# Patient Record
Sex: MALE | Race: WHITE
[De-identification: names, ages, dates, MRNs, and addresses within clinical notes are randomized per-mention and may not be internally consistent; named-entity substitution may affect disease eponyms.]

---

## 2019-11-11 ENCOUNTER — HOSPITAL ENCOUNTER (OUTPATIENT)
Dept: HOSPITAL 19 - COL.RAD | Age: 29
End: 2019-11-11
Payer: COMMERCIAL

## 2019-11-11 DIAGNOSIS — Z12.89: Primary | ICD-10-CM

## 2019-11-11 DIAGNOSIS — D12.6: ICD-10-CM

## 2019-11-11 DIAGNOSIS — Z90.49: ICD-10-CM

## 2023-04-06 ENCOUNTER — HOSPITAL ENCOUNTER (OUTPATIENT)
Dept: HOSPITAL 19 - COL.RAD | Age: 33
End: 2023-04-06
Attending: UROLOGY
Payer: COMMERCIAL

## 2023-04-06 VITALS — SYSTOLIC BLOOD PRESSURE: 117 MMHG | DIASTOLIC BLOOD PRESSURE: 71 MMHG | HEART RATE: 74 BPM

## 2023-04-06 VITALS — SYSTOLIC BLOOD PRESSURE: 121 MMHG | DIASTOLIC BLOOD PRESSURE: 72 MMHG | HEART RATE: 62 BPM

## 2023-04-06 VITALS — HEART RATE: 59 BPM | SYSTOLIC BLOOD PRESSURE: 117 MMHG | DIASTOLIC BLOOD PRESSURE: 75 MMHG

## 2023-04-06 VITALS — SYSTOLIC BLOOD PRESSURE: 113 MMHG | HEART RATE: 60 BPM | DIASTOLIC BLOOD PRESSURE: 74 MMHG

## 2023-04-06 VITALS — DIASTOLIC BLOOD PRESSURE: 55 MMHG | HEART RATE: 62 BPM | SYSTOLIC BLOOD PRESSURE: 146 MMHG | TEMPERATURE: 98.3 F

## 2023-04-06 VITALS — HEART RATE: 59 BPM | SYSTOLIC BLOOD PRESSURE: 114 MMHG | DIASTOLIC BLOOD PRESSURE: 70 MMHG

## 2023-04-06 VITALS — SYSTOLIC BLOOD PRESSURE: 123 MMHG | DIASTOLIC BLOOD PRESSURE: 83 MMHG

## 2023-04-06 VITALS — DIASTOLIC BLOOD PRESSURE: 79 MMHG | HEART RATE: 74 BPM | SYSTOLIC BLOOD PRESSURE: 128 MMHG

## 2023-04-06 VITALS — DIASTOLIC BLOOD PRESSURE: 75 MMHG | HEART RATE: 54 BPM | SYSTOLIC BLOOD PRESSURE: 113 MMHG

## 2023-04-06 VITALS — SYSTOLIC BLOOD PRESSURE: 130 MMHG | DIASTOLIC BLOOD PRESSURE: 76 MMHG | HEART RATE: 73 BPM

## 2023-04-06 VITALS — HEART RATE: 63 BPM | SYSTOLIC BLOOD PRESSURE: 115 MMHG | DIASTOLIC BLOOD PRESSURE: 74 MMHG

## 2023-04-06 VITALS — DIASTOLIC BLOOD PRESSURE: 73 MMHG | SYSTOLIC BLOOD PRESSURE: 115 MMHG | HEART RATE: 73 BPM

## 2023-04-06 VITALS — DIASTOLIC BLOOD PRESSURE: 73 MMHG | HEART RATE: 68 BPM | SYSTOLIC BLOOD PRESSURE: 123 MMHG

## 2023-04-06 VITALS — DIASTOLIC BLOOD PRESSURE: 70 MMHG | SYSTOLIC BLOOD PRESSURE: 120 MMHG | HEART RATE: 55 BPM

## 2023-04-06 VITALS — WEIGHT: 262.35 LBS | BODY MASS INDEX: 41.18 KG/M2 | HEIGHT: 67.01 IN

## 2023-04-06 VITALS — DIASTOLIC BLOOD PRESSURE: 69 MMHG | HEART RATE: 56 BPM | SYSTOLIC BLOOD PRESSURE: 116 MMHG

## 2023-04-06 DIAGNOSIS — N28.89: Primary | ICD-10-CM

## 2023-04-06 NOTE — NUR
pt to ct per ambulation.  Pt positioned in prone position on ct table.
Monitors applied.  O2 on at 2l/nc.

## 2024-08-15 ENCOUNTER — HOSPITAL ENCOUNTER (INPATIENT)
Dept: HOSPITAL 19 - COL.ER | Age: 34
LOS: 2 days | Discharge: HOME | DRG: 641 | End: 2024-08-17
Attending: INTERNAL MEDICINE | Admitting: INTERNAL MEDICINE
Payer: COMMERCIAL

## 2024-08-15 VITALS — WEIGHT: 220.02 LBS | HEIGHT: 67.01 IN | BODY MASS INDEX: 34.53 KG/M2

## 2024-08-15 DIAGNOSIS — N17.9: ICD-10-CM

## 2024-08-15 DIAGNOSIS — M62.838: ICD-10-CM

## 2024-08-15 DIAGNOSIS — R11.2: ICD-10-CM

## 2024-08-15 DIAGNOSIS — E86.0: Primary | ICD-10-CM

## 2024-08-15 DIAGNOSIS — Z20.822: ICD-10-CM

## 2024-08-15 DIAGNOSIS — R50.9: ICD-10-CM

## 2024-08-15 DIAGNOSIS — N28.89: ICD-10-CM

## 2024-08-15 LAB
ALBUMIN SERPL-MCNC: 2.7 G/DL (ref 3.5–5)
ALP SERPL-CCNC: 179 U/L (ref 40–150)
ALT SERPL-CCNC: 51 U/L (ref 0–55)
ANION GAP SERPL CALC-SCNC: 25 MMOL/L (ref 7–16)
APPEARANCE UR: CLEAR
AST SERPL-CCNC: 38 U/L (ref 5–34)
BILIRUB SERPL-MCNC: 1.5 MG/DL (ref 0.2–1.2)
BUN SERPL-MCNC: 13 MG/DL (ref 9–21)
CALCIUM SERPL-MCNC: 9.1 MG/DL (ref 8.4–10.2)
CHLORIDE SERPL-SCNC: 91 MEQ/L (ref 98–107)
COLOR UR AUTO: YELLOW
CREAT SERPL-SCNC: 1.57 MG/DL (ref 0.72–1.25)
CRP SERPL-MCNC: 22.03 MG/DL (ref 0–0.5)
DRUGS UR SCN NOM: NEGATIVE NG/ML
ERYTHROCYTE [DISTWIDTH] IN BLOOD BY AUTOMATED COUNT: 12.6 % (ref 11.5–14.5)
GLUCOSE SERPL-MCNC: 110 MG/DL (ref 70–99)
GLUCOSE UR QL STRIP.AUTO: NEGATIVE
HCT VFR BLD AUTO: 50.3 % (ref 42–52)
HGB BLD-MCNC: 17.2 G/DL (ref 13.5–18)
KETONES UR STRIP.AUTO-MCNC: NEGATIVE MG/DL
LYMPHOCYTES NFR BLD MANUAL: 1 % (ref 20–51)
MCH RBC QN AUTO: 29 PG (ref 27–31)
MCHC RBC AUTO-ENTMCNC: 34 G/DL (ref 33–37)
MCV RBC AUTO: 86 FL (ref 80–100)
MONOCYTES NFR BLD: 2 % (ref 1.7–9.3)
NEUTS BAND NFR BLD: 6 % (ref 0–10)
NEUTS SEG NFR BLD MANUAL: 91 % (ref 42–75.2)
NITRATE UR-MCNC: NEGATIVE UG/ML
PH UR STRIP.AUTO: 5.5 [PH] (ref 5–8.5)
PLATELET # BLD AUTO: 349 K/MM3 (ref 130–400)
PLATELET BLD QL SMEAR: NORMAL
PMV BLD AUTO: 9.1 FL (ref 7.4–10.4)
POTASSIUM SERPL-SCNC: 4.3 MEQ/L (ref 3.5–4.5)
PROT SERPL-MCNC: 8.1 G/DL (ref 6.2–8.1)
RBC # BLD AUTO: 5.87 M/MM3 (ref 4.2–5.6)
RBC # UR STRIP.AUTO: NEGATIVE /UL
RBC # UR: (no result) /HPF (ref 0–2)
SODIUM SERPL-SCNC: 132 MEQ/L (ref 136–145)
SP GR UR STRIP.AUTO: 1.01 (ref 1–1.03)
TOXIC GRANULES BLD QL SMEAR: PRESENT
UA DIPSTICK PNL UR STRIP.AUTO: (no result)
URN COLLECT METHOD CLASS: (no result)
UROBILINOGEN UR STRIP.AUTO-MCNC: 0.2 E.U/DL (ref 0.2–1)
WBC # UR: (no result) /HPF (ref 0–2)

## 2024-08-16 VITALS — DIASTOLIC BLOOD PRESSURE: 72 MMHG | HEART RATE: 75 BPM | TEMPERATURE: 98.6 F | SYSTOLIC BLOOD PRESSURE: 124 MMHG

## 2024-08-16 VITALS — SYSTOLIC BLOOD PRESSURE: 106 MMHG | HEART RATE: 73 BPM | TEMPERATURE: 97.6 F | DIASTOLIC BLOOD PRESSURE: 67 MMHG

## 2024-08-16 VITALS — SYSTOLIC BLOOD PRESSURE: 124 MMHG | HEART RATE: 73 BPM | TEMPERATURE: 98.7 F | DIASTOLIC BLOOD PRESSURE: 73 MMHG

## 2024-08-16 VITALS — DIASTOLIC BLOOD PRESSURE: 73 MMHG | TEMPERATURE: 98.3 F | HEART RATE: 76 BPM | SYSTOLIC BLOOD PRESSURE: 125 MMHG

## 2024-08-16 VITALS — HEART RATE: 76 BPM | TEMPERATURE: 97.8 F | DIASTOLIC BLOOD PRESSURE: 74 MMHG | SYSTOLIC BLOOD PRESSURE: 113 MMHG

## 2024-08-16 VITALS — SYSTOLIC BLOOD PRESSURE: 124 MMHG

## 2024-08-16 VITALS — SYSTOLIC BLOOD PRESSURE: 125 MMHG

## 2024-08-16 LAB
ANION GAP SERPL CALC-SCNC: 16 MMOL/L (ref 7–16)
BUN SERPL-MCNC: 14 MG/DL (ref 9–21)
BURR CELLS BLD QL SMEAR: (no result)
CALCIUM SERPL-MCNC: 8.4 MG/DL (ref 8.4–10.2)
CHLORIDE SERPL-SCNC: 97 MEQ/L (ref 98–107)
CREAT SERPL-SCNC: 1.21 MG/DL (ref 0.72–1.25)
ERYTHROCYTE [DISTWIDTH] IN BLOOD BY AUTOMATED COUNT: 12.9 % (ref 11.5–14.5)
GLUCOSE SERPL-MCNC: 83 MG/DL (ref 70–99)
HCT VFR BLD AUTO: 44.1 % (ref 42–52)
HGB BLD-MCNC: 15.3 G/DL (ref 13.5–18)
LYMPHOCYTES NFR BLD MANUAL: 1 % (ref 20–51)
MCH RBC QN AUTO: 29 PG (ref 27–31)
MCHC RBC AUTO-ENTMCNC: 35 G/DL (ref 33–37)
MCV RBC AUTO: 84 FL (ref 80–100)
MONOCYTES NFR BLD: 2 % (ref 1.7–9.3)
NEUTS BAND NFR BLD: 15 % (ref 0–10)
NEUTS SEG NFR BLD MANUAL: 82 % (ref 42–75.2)
PLATELET # BLD AUTO: 282 K/MM3 (ref 130–400)
PLATELET BLD QL SMEAR: NORMAL
PMV BLD AUTO: 8.8 FL (ref 7.4–10.4)
POTASSIUM SERPL-SCNC: 3.8 MEQ/L (ref 3.5–4.5)
RBC # BLD AUTO: 5.23 M/MM3 (ref 4.2–5.6)
SODIUM SERPL-SCNC: 132 MEQ/L (ref 136–145)

## 2024-08-16 NOTE — NUR
Patient inquired about being discharged tonight. Spoke with Dr. Issa and she
would like to keep the patient overnight to monitor for fever and check am
labs. Patient made aware and verbalizes understanding. Sister still at
bedside. Denies pain or needs at this time.

## 2024-08-16 NOTE — NUR
Patient admitted to room 315 with LINDA at approximately 1000.  Pt a/o x4.
Denies pain, nauea or needs at this time. Admission Intake, assessment and med
rec completed. NS started to LW at 100ml/hr. Independent in room. Sister,
Kaylan, at bedside.

## 2024-08-17 VITALS — SYSTOLIC BLOOD PRESSURE: 114 MMHG

## 2024-08-17 VITALS — SYSTOLIC BLOOD PRESSURE: 114 MMHG | DIASTOLIC BLOOD PRESSURE: 70 MMHG | HEART RATE: 78 BPM | TEMPERATURE: 98.1 F

## 2024-08-17 VITALS — DIASTOLIC BLOOD PRESSURE: 63 MMHG | SYSTOLIC BLOOD PRESSURE: 117 MMHG | TEMPERATURE: 98.6 F | HEART RATE: 63 BPM

## 2024-08-17 VITALS — SYSTOLIC BLOOD PRESSURE: 117 MMHG

## 2024-08-17 LAB
ANION GAP SERPL CALC-SCNC: 11 MMOL/L (ref 7–16)
BASOPHILS # BLD: 0 K/MM3 (ref 0–0.2)
BASOPHILS NFR BLD AUTO: 0.2 % (ref 0–2)
BUN SERPL-MCNC: 9 MG/DL (ref 9–21)
CALCIUM SERPL-MCNC: 8 MG/DL (ref 8.4–10.2)
CHLORIDE SERPL-SCNC: 101 MEQ/L (ref 98–107)
CK SERPL-CCNC: 548 U/L (ref 30–200)
CREAT SERPL-SCNC: 0.86 MG/DL (ref 0.72–1.25)
EOSINOPHIL # BLD: 0 K/MM3 (ref 0–0.7)
EOSINOPHIL NFR BLD: 0.4 % (ref 0–4)
ERYTHROCYTE [DISTWIDTH] IN BLOOD BY AUTOMATED COUNT: 12.8 % (ref 11.5–14.5)
GLUCOSE SERPL-MCNC: 92 MG/DL (ref 70–99)
GRANULOCYTES # BLD AUTO: 87.2 % (ref 42.2–75.2)
HCT VFR BLD AUTO: 37.6 % (ref 42–52)
HGB BLD-MCNC: 13.1 G/DL (ref 13.5–18)
LYMPHOCYTES # BLD: 0.3 K/MM3 (ref 1.2–3.4)
LYMPHOCYTES NFR BLD: 3.1 % (ref 20–51)
MCH RBC QN AUTO: 29 PG (ref 27–31)
MCHC RBC AUTO-ENTMCNC: 35 G/DL (ref 33–37)
MCV RBC AUTO: 84 FL (ref 80–100)
MONOCYTES # BLD: 0.8 K/MM3 (ref 0.1–0.6)
MONOCYTES NFR BLD AUTO: 8.4 % (ref 1.7–9.3)
NEUTROPHILS # BLD: 8.6 K/MM3 (ref 1.4–6.5)
PLATELET # BLD AUTO: 263 K/MM3 (ref 130–400)
PMV BLD AUTO: 9 FL (ref 7.4–10.4)
POTASSIUM SERPL-SCNC: 3.1 MEQ/L (ref 3.5–4.5)
RBC # BLD AUTO: 4.47 M/MM3 (ref 4.2–5.6)
SODIUM SERPL-SCNC: 135 MEQ/L (ref 136–145)

## 2024-08-17 NOTE — NUR
PATIENT ALERT AND ORIENTED X4. PATIENT ON ROOM AIR. DENIES PAIN AT THIS TIME.
PATIENT REPORTS WANTING TO GO HOME SOON.PATIENT FLUIDS RUNNING PER EMAR.
PATIENT INDEPENDENT IN ROOM. CALL LIGHT WITHIN REACH. BED AT LOWEST POSITION.
NO OTHER CONCERNS AT THIS TIME.

## 2024-08-17 NOTE — NUR
PATIENT DISCHARGE INSTRUCTIONS GIVEN. IV REMOVED. PATIENT DENIED ANY
QUESTIONS OR CONCERNS. PATIENT INSTRUCTED TO CALL WHEN RIDE IS HERE TO BE
ESCORTED OUT BY PCT.

## 2024-08-17 NOTE — NUR
SW met with patient for intake and to complete discharge planning. Patient
lives in [place], PCP is Dr Hackett and pharmacy of choice is [facility]. Patient
reports no current DMEs and independent with ADLs. NOK is sister Kaylan Souzalips [phone]. DPOA information was provided to patient, declined
completing at this time.
 
Discharge plan: home